# Patient Record
Sex: MALE | NOT HISPANIC OR LATINO | URBAN - METROPOLITAN AREA
[De-identification: names, ages, dates, MRNs, and addresses within clinical notes are randomized per-mention and may not be internally consistent; named-entity substitution may affect disease eponyms.]

---

## 2017-05-27 ENCOUNTER — OFFICE VISIT (OUTPATIENT)
Dept: RETAIL CLINIC | Facility: CLINIC | Age: 67
End: 2017-05-27

## 2017-05-27 VITALS
TEMPERATURE: 98.2 F | BODY MASS INDEX: 30.48 KG/M2 | OXYGEN SATURATION: 98 % | SYSTOLIC BLOOD PRESSURE: 136 MMHG | HEIGHT: 72 IN | DIASTOLIC BLOOD PRESSURE: 84 MMHG | WEIGHT: 225 LBS | HEART RATE: 104 BPM

## 2017-05-27 DIAGNOSIS — L23.7 ALLERGIC CONTACT DERMATITIS DUE TO PLANTS, EXCEPT FOOD: Primary | ICD-10-CM

## 2017-05-27 PROCEDURE — 99203 OFFICE O/P NEW LOW 30 MIN: CPT | Performed by: NURSE PRACTITIONER

## 2017-05-27 RX ORDER — BUDESONIDE AND FORMOTEROL FUMARATE DIHYDRATE 160; 4.5 UG/1; UG/1
2 AEROSOL RESPIRATORY (INHALATION)
COMMUNITY

## 2017-05-27 RX ORDER — PREDNISONE 10 MG/1
TABLET ORAL DAILY
Qty: 21 EACH | Refills: 0 | Status: SHIPPED | OUTPATIENT
Start: 2017-05-27 | End: 2017-06-02

## 2017-05-27 RX ORDER — OMEPRAZOLE 20 MG/1
20 CAPSULE, DELAYED RELEASE ORAL DAILY
COMMUNITY

## 2017-07-25 ENCOUNTER — OFFICE VISIT (OUTPATIENT)
Dept: RETAIL CLINIC | Facility: CLINIC | Age: 67
End: 2017-07-25

## 2017-07-25 VITALS
BODY MASS INDEX: 32.7 KG/M2 | RESPIRATION RATE: 20 BRPM | DIASTOLIC BLOOD PRESSURE: 78 MMHG | SYSTOLIC BLOOD PRESSURE: 128 MMHG | OXYGEN SATURATION: 98 % | HEART RATE: 91 BPM | TEMPERATURE: 98.5 F | WEIGHT: 241.4 LBS | HEIGHT: 72 IN

## 2017-07-25 DIAGNOSIS — L23.7 CONTACT DERMATITIS DUE TO POISON IVY: Primary | ICD-10-CM

## 2017-07-25 PROCEDURE — 99213 OFFICE O/P EST LOW 20 MIN: CPT | Performed by: NURSE PRACTITIONER

## 2017-07-25 RX ORDER — PREDNISONE 10 MG/1
TABLET ORAL
Qty: 21 TABLET | Refills: 0 | Status: SHIPPED | OUTPATIENT
Start: 2017-07-25 | End: 2017-09-25

## 2017-07-25 NOTE — PROGRESS NOTES
"Idalia Engel is a 67 y.o. male.   Chief Complaint   Patient presents with   • Rash      HPI Comments: 68 yo w/m presents with complaint of itchy rash after mowing, rash onset 7-8 days ago, was seen for same 2 months ago and treatment was effective.Has used various otc meds without relief.  No fever, no associated symptoms.  See ros for additional information.    Rash          The following portions of the patient's history were reviewed and updated as appropriate: allergies, current medications, past family history, past medical history, past social history, past surgical history and problem list.    Current Outpatient Prescriptions:   •  budesonide-formoterol (SYMBICORT) 160-4.5 MCG/ACT inhaler, Inhale 2 puffs 2 (Two) Times a Day., Disp: , Rfl:   •  loratadine-pseudoephedrine (CLARITIN-D 24 HOUR)  MG per 24 hr tablet, Take 1 tablet by mouth Daily., Disp: , Rfl:   •  omeprazole (priLOSEC) 20 MG capsule, Take 20 mg by mouth Daily., Disp: , Rfl:   •  predniSONE (DELTASONE) 10 MG tablet, Take 6 tabs on day 1, then 5,4,3,2,1, Disp: 21 tablet, Rfl: 0    Review of Systems   Constitutional: Negative.    HENT: Negative.    Respiratory: Negative.    Cardiovascular: Negative.    Skin: Positive for rash.   Psychiatric/Behavioral: Negative.      /78  Pulse 91  Temp 98.5 °F (36.9 °C) (Oral)   Resp 20  Ht 72\" (182.9 cm)  Wt 241 lb 6.4 oz (109 kg)  SpO2 98%  BMI 32.74 kg/m2    Objective   Allergies   Allergen Reactions   • Aspirin    • Penicillins        Physical Exam   Constitutional: He appears well-developed and well-nourished.   HENT:   Head: Normocephalic and atraumatic.   Cardiovascular: Normal rate, regular rhythm and normal heart sounds.    Pulmonary/Chest: Effort normal and breath sounds normal.   Skin:   Vesicles on erythematous base noted on BUE, torso, and BLE, is scattered with few areas on each site.   Psychiatric: He has a normal mood and affect. His behavior is normal.   Vitals " reviewed.      Assessment/Plan   Siva was seen today for rash.    Diagnoses and all orders for this visit:    Contact dermatitis due to poison ivy    Other orders  -     predniSONE (DELTASONE) 10 MG tablet; Take 6 tabs on day 1, then 5,4,3,2,1            An After Visit Summary was printed, reviewed, and given to the patient. Understanding verbalized and agrees with treatment plan.  If no improvement or becomes worse, follow up with primary or go to [facility] [[email or web address].             July 25, 2017 1:03 PM

## 2017-07-25 NOTE — PATIENT INSTRUCTIONS
Poison Ivy Dermatitis  Poison ivy dermatitis is inflammation of the skin that is caused by the allergens on the leaves of the poison ivy plant. The skin reaction often involves redness, swelling, blisters, and extreme itching.  CAUSES  This condition is caused by a specific chemical (urushiol) found in the sap of the poison ivy plant. This chemical is sticky and can be easily spread to people, animals, and objects. You can get poison ivy dermatitis by:  · Having direct contact with a poison ivy plant.  · Touching animals, other people, or objects that have come in contact with poison ivy and have the chemical on them.  RISK FACTORS  This condition is more likely to develop in:  · People who are outdoors often.  · People who go outdoors without wearing protective clothing, such as closed shoes, long pants, and a long-sleeved shirt.  SYMPTOMS  Symptoms of this condition include:  · Redness and itching.  · A rash that often includes bumps and blisters. The rash usually appears 48 hours after exposure.  · Swelling. This may occur if the reaction is more severe.  Symptoms usually last for 1-2 weeks. However, the first time you develop this condition, symptoms may last 3-4 weeks.  DIAGNOSIS  This condition may be diagnosed based on your symptoms and a physical exam. Your health care provider may also ask you about any recent outdoor activity.  TREATMENT  Treatment for this condition will vary depending on how severe it is. Treatment may include:  · Hydrocortisone creams or calamine lotions to relieve itching.  · Oatmeal baths to soothe the skin.  · Over-the-counter antihistamine tablets.  · Oral steroid medicine for more severe outbreaks.  HOME CARE INSTRUCTIONS  · Take or apply over-the-counter and prescription medicines only as told by your health care provider.  · Wash exposed skin as soon as possible with soap and cold water.  · Use hydrocortisone creams or calamine lotion as needed to soothe the skin and relieve  itching.  · Take oatmeal baths as needed. Use colloidal oatmeal. You can get this at your local pharmacy or grocery store. Follow the instructions on the packaging.  · Do not scratch or rub your skin.  · While you have the rash, wash clothes right after you wear them.  PREVENTION  · Learn to identify the poison ivy plant and avoid contact with the plant. This plant can be recognized by the number of leaves. Generally, poison ivy has three leaves with flowering branches on a single stem. The leaves are typically glossy, and they have jagged edges that come to a point at the front.  · If you have been exposed to poison ivy, thoroughly wash with soap and water right away. You have about 30 minutes to remove the plant resin before it will cause the rash. Be sure to wash under your fingernails because any plant resin there will continue to spread the rash.  · When hiking or camping, wear clothes that will help you to avoid exposure on the skin. This includes long pants, a long-sleeved shirt, tall socks, and hiking boots. You can also apply preventive lotion to your skin to help limit exposure.  · If you suspect that your clothes or outdoor gear came in contact with poison ivy, rinse them off outside with a garden hose before you bring them inside your house.  SEEK MEDICAL CARE IF:  · You have open sores in the rash area.  · You have more redness, swelling, or pain in the affected area.  · You have redness that spreads beyond the rash area.  · You have fluid, blood, or pus coming from the affected area.  · You have a fever.  · You have a rash over a large area of your body.  · You have a rash on your eyes, mouth, or genitals.  · Your rash does not improve after a few days.  SEEK IMMEDIATE MEDICAL CARE IF:  · Your face swells or your eyes swell shut.    · You have trouble breathing.    · You have trouble swallowing.       This information is not intended to replace advice given to you by your health care provider. Make  sure you discuss any questions you have with your health care provider.     Document Released: 12/15/2001 Document Revised: 04/10/2017 Document Reviewed: 05/25/2016  [employer] Interactive Patient Education ©2017 [employer]

## 2017-09-25 ENCOUNTER — OFFICE VISIT (OUTPATIENT)
Dept: RETAIL CLINIC | Facility: CLINIC | Age: 67
End: 2017-09-25

## 2017-09-25 DIAGNOSIS — L23.7 ALLERGIC CONTACT DERMATITIS DUE TO PLANT: Primary | ICD-10-CM

## 2017-09-25 PROCEDURE — 96372 THER/PROPH/DIAG INJ SC/IM: CPT | Performed by: NURSE PRACTITIONER

## 2017-09-25 PROCEDURE — 99213 OFFICE O/P EST LOW 20 MIN: CPT | Performed by: NURSE PRACTITIONER

## 2017-09-25 RX ORDER — METHYLPREDNISOLONE 4 MG/1
TABLET ORAL
Qty: 1 EACH | Refills: 0 | Status: SHIPPED | OUTPATIENT
Start: 2017-09-25

## 2017-09-25 RX ORDER — ALBUTEROL SULFATE 90 UG/1
2 AEROSOL, METERED RESPIRATORY (INHALATION) EVERY 4 HOURS PRN
COMMUNITY

## 2017-09-25 RX ORDER — DEXAMETHASONE SODIUM PHOSPHATE 4 MG/ML
4 INJECTION, SOLUTION INTRA-ARTICULAR; INTRALESIONAL; INTRAMUSCULAR; INTRAVENOUS; SOFT TISSUE ONCE
Status: COMPLETED | OUTPATIENT
Start: 2017-09-25 | End: 2017-09-25

## 2017-09-25 RX ADMIN — DEXAMETHASONE SODIUM PHOSPHATE 4 MG: 4 INJECTION, SOLUTION INTRA-ARTICULAR; INTRALESIONAL; INTRAMUSCULAR; INTRAVENOUS; SOFT TISSUE at 16:03

## 2017-10-09 VITALS
RESPIRATION RATE: 16 BRPM | TEMPERATURE: 98 F | HEIGHT: 72 IN | WEIGHT: 233 LBS | DIASTOLIC BLOOD PRESSURE: 78 MMHG | OXYGEN SATURATION: 97 % | BODY MASS INDEX: 31.56 KG/M2 | HEART RATE: 88 BPM | SYSTOLIC BLOOD PRESSURE: 138 MMHG

## 2019-05-23 ENCOUNTER — TRANSCRIBE ORDERS (OUTPATIENT)
Dept: PULMONOLOGY | Facility: HOSPITAL | Age: 69
End: 2019-05-23

## 2019-05-23 DIAGNOSIS — G47.33 OBSTRUCTIVE SLEEP APNEA (ADULT) (PEDIATRIC): Primary | ICD-10-CM

## 2019-06-13 ENCOUNTER — HOSPITAL ENCOUNTER (OUTPATIENT)
Dept: SLEEP MEDICINE | Facility: HOSPITAL | Age: 69
Discharge: HOME | End: 2019-06-13
Admitting: NURSE PRACTITIONER

## 2019-06-13 VITALS
BODY MASS INDEX: 30.91 KG/M2 | HEIGHT: 72 IN | SYSTOLIC BLOOD PRESSURE: 115 MMHG | WEIGHT: 228.2 LBS | OXYGEN SATURATION: 95 % | HEART RATE: 64 BPM | DIASTOLIC BLOOD PRESSURE: 60 MMHG

## 2019-06-13 DIAGNOSIS — G47.33 OBSTRUCTIVE SLEEP APNEA (ADULT) (PEDIATRIC): ICD-10-CM

## 2019-06-13 PROCEDURE — 95806 SLEEP STUDY UNATT&RESP EFFT: CPT

## 2019-06-13 PROCEDURE — 95806 SLEEP STUDY UNATT&RESP EFFT: CPT | Performed by: INTERNAL MEDICINE

## 2019-06-13 RX ORDER — EZETIMIBE 10 MG/1
10 TABLET ORAL DAILY
COMMUNITY

## 2019-06-13 RX ORDER — ATORVASTATIN CALCIUM 80 MG/1
80 TABLET, FILM COATED ORAL DAILY
COMMUNITY

## 2019-06-13 RX ORDER — RANOLAZINE 500 MG/1
500 TABLET, EXTENDED RELEASE ORAL 2 TIMES DAILY
COMMUNITY

## 2019-06-13 RX ORDER — CARVEDILOL 12.5 MG/1
12.5 TABLET ORAL 2 TIMES DAILY WITH MEALS
COMMUNITY